# Patient Record
Sex: MALE | Race: WHITE | NOT HISPANIC OR LATINO | Employment: OTHER | URBAN - METROPOLITAN AREA
[De-identification: names, ages, dates, MRNs, and addresses within clinical notes are randomized per-mention and may not be internally consistent; named-entity substitution may affect disease eponyms.]

---

## 2019-01-11 ENCOUNTER — HOSPITAL ENCOUNTER (EMERGENCY)
Facility: HOSPITAL | Age: 74
Discharge: HOME | End: 2019-01-11
Attending: EMERGENCY MEDICINE | Admitting: EMERGENCY MEDICINE

## 2019-01-11 ENCOUNTER — APPOINTMENT (OUTPATIENT)
Dept: GENERAL RADIOLOGY | Facility: HOSPITAL | Age: 74
End: 2019-01-11

## 2019-01-11 VITALS
RESPIRATION RATE: 18 BRPM | BODY MASS INDEX: 42.66 KG/M2 | DIASTOLIC BLOOD PRESSURE: 77 MMHG | WEIGHT: 315 LBS | HEIGHT: 72 IN | TEMPERATURE: 98.1 F | HEART RATE: 77 BPM | OXYGEN SATURATION: 92 % | SYSTOLIC BLOOD PRESSURE: 123 MMHG

## 2019-01-11 DIAGNOSIS — J20.9 BRONCHOSPASM WITH BRONCHITIS, ACUTE: Primary | ICD-10-CM

## 2019-01-11 LAB
ALBUMIN SERPL-MCNC: 3.2 G/DL (ref 3.5–5.2)
ALBUMIN/GLOB SERPL: 0.9 G/DL
ALP SERPL-CCNC: 114 U/L (ref 39–117)
ALT SERPL W P-5'-P-CCNC: 43 U/L (ref 1–41)
ANION GAP SERPL CALCULATED.3IONS-SCNC: 11.9 MMOL/L
AST SERPL-CCNC: 40 U/L (ref 1–40)
BASOPHILS # BLD AUTO: 0.01 10*3/MM3 (ref 0–0.2)
BASOPHILS NFR BLD AUTO: 0.1 % (ref 0–1.5)
BILIRUB SERPL-MCNC: 0.9 MG/DL (ref 0.1–1.2)
BUN BLD-MCNC: 18 MG/DL (ref 8–23)
BUN/CREAT SERPL: 15.7 (ref 7–25)
CALCIUM SPEC-SCNC: 8.4 MG/DL (ref 8.6–10.5)
CHLORIDE SERPL-SCNC: 95 MMOL/L (ref 98–107)
CO2 SERPL-SCNC: 29.1 MMOL/L (ref 22–29)
CREAT BLD-MCNC: 1.15 MG/DL (ref 0.76–1.27)
D-LACTATE SERPL-SCNC: 2.1 MMOL/L (ref 0.5–2)
DEPRECATED RDW RBC AUTO: 56 FL (ref 37–54)
EOSINOPHIL # BLD AUTO: 0.02 10*3/MM3 (ref 0–0.7)
EOSINOPHIL NFR BLD AUTO: 0.2 % (ref 0.3–6.2)
ERYTHROCYTE [DISTWIDTH] IN BLOOD BY AUTOMATED COUNT: 14.5 % (ref 11.5–14.5)
FLUAV AG NPH QL: NEGATIVE
FLUBV AG NPH QL IA: NEGATIVE
GFR SERPL CREATININE-BSD FRML MDRD: 63 ML/MIN/1.73
GLOBULIN UR ELPH-MCNC: 3.5 GM/DL
GLUCOSE BLD-MCNC: 201 MG/DL (ref 65–99)
HCT VFR BLD AUTO: 45.1 % (ref 40.4–52.2)
HGB BLD-MCNC: 14.4 G/DL (ref 13.7–17.6)
HOLD SPECIMEN: NORMAL
IMM GRANULOCYTES # BLD AUTO: 0.03 10*3/MM3 (ref 0–0.03)
IMM GRANULOCYTES NFR BLD AUTO: 0.3 % (ref 0–0.5)
LYMPHOCYTES # BLD AUTO: 2.88 10*3/MM3 (ref 0.9–4.8)
LYMPHOCYTES NFR BLD AUTO: 25.1 % (ref 19.6–45.3)
MCH RBC QN AUTO: 33.4 PG (ref 27–32.7)
MCHC RBC AUTO-ENTMCNC: 31.9 G/DL (ref 32.6–36.4)
MCV RBC AUTO: 104.6 FL (ref 79.8–96.2)
MONOCYTES # BLD AUTO: 1 10*3/MM3 (ref 0.2–1.2)
MONOCYTES NFR BLD AUTO: 8.7 % (ref 5–12)
NEUTROPHILS # BLD AUTO: 7.52 10*3/MM3 (ref 1.9–8.1)
NEUTROPHILS NFR BLD AUTO: 65.6 % (ref 42.7–76)
NT-PROBNP SERPL-MCNC: 70.5 PG/ML (ref 0–900)
PLATELET # BLD AUTO: 207 10*3/MM3 (ref 140–500)
PMV BLD AUTO: 10.3 FL (ref 6–12)
POTASSIUM BLD-SCNC: 3.4 MMOL/L (ref 3.5–5.2)
PROCALCITONIN SERPL-MCNC: 0.06 NG/ML (ref 0.1–0.25)
PROT SERPL-MCNC: 6.7 G/DL (ref 6–8.5)
RBC # BLD AUTO: 4.31 10*6/MM3 (ref 4.6–6)
SODIUM BLD-SCNC: 136 MMOL/L (ref 136–145)
TROPONIN T SERPL-MCNC: <0.01 NG/ML (ref 0–0.03)
WBC NRBC COR # BLD: 11.46 10*3/MM3 (ref 4.5–10.7)

## 2019-01-11 PROCEDURE — 93010 ELECTROCARDIOGRAM REPORT: CPT | Performed by: INTERNAL MEDICINE

## 2019-01-11 PROCEDURE — 94799 UNLISTED PULMONARY SVC/PX: CPT

## 2019-01-11 PROCEDURE — 71046 X-RAY EXAM CHEST 2 VIEWS: CPT

## 2019-01-11 PROCEDURE — 36415 COLL VENOUS BLD VENIPUNCTURE: CPT | Performed by: EMERGENCY MEDICINE

## 2019-01-11 PROCEDURE — 99284 EMERGENCY DEPT VISIT MOD MDM: CPT

## 2019-01-11 PROCEDURE — 87804 INFLUENZA ASSAY W/OPTIC: CPT | Performed by: EMERGENCY MEDICINE

## 2019-01-11 PROCEDURE — 80053 COMPREHEN METABOLIC PANEL: CPT | Performed by: EMERGENCY MEDICINE

## 2019-01-11 PROCEDURE — 25010000002 METHYLPREDNISOLONE PER 125 MG: Performed by: EMERGENCY MEDICINE

## 2019-01-11 PROCEDURE — 94640 AIRWAY INHALATION TREATMENT: CPT

## 2019-01-11 PROCEDURE — 83605 ASSAY OF LACTIC ACID: CPT | Performed by: EMERGENCY MEDICINE

## 2019-01-11 PROCEDURE — 93005 ELECTROCARDIOGRAM TRACING: CPT | Performed by: EMERGENCY MEDICINE

## 2019-01-11 PROCEDURE — 83880 ASSAY OF NATRIURETIC PEPTIDE: CPT | Performed by: EMERGENCY MEDICINE

## 2019-01-11 PROCEDURE — 96374 THER/PROPH/DIAG INJ IV PUSH: CPT

## 2019-01-11 PROCEDURE — 85025 COMPLETE CBC W/AUTO DIFF WBC: CPT | Performed by: EMERGENCY MEDICINE

## 2019-01-11 PROCEDURE — 84145 PROCALCITONIN (PCT): CPT | Performed by: EMERGENCY MEDICINE

## 2019-01-11 PROCEDURE — 93005 ELECTROCARDIOGRAM TRACING: CPT

## 2019-01-11 PROCEDURE — 84484 ASSAY OF TROPONIN QUANT: CPT | Performed by: EMERGENCY MEDICINE

## 2019-01-11 RX ORDER — METHYLPREDNISOLONE SODIUM SUCCINATE 125 MG/2ML
125 INJECTION, POWDER, LYOPHILIZED, FOR SOLUTION INTRAMUSCULAR; INTRAVENOUS ONCE
Status: COMPLETED | OUTPATIENT
Start: 2019-01-11 | End: 2019-01-11

## 2019-01-11 RX ORDER — ALBUTEROL SULFATE 2.5 MG/3ML
2.5 SOLUTION RESPIRATORY (INHALATION) ONCE
Status: COMPLETED | OUTPATIENT
Start: 2019-01-11 | End: 2019-01-11

## 2019-01-11 RX ORDER — SPIRONOLACTONE 25 MG/1
25-50 TABLET ORAL DAILY
COMMUNITY

## 2019-01-11 RX ORDER — SODIUM CHLORIDE 0.9 % (FLUSH) 0.9 %
10 SYRINGE (ML) INJECTION AS NEEDED
Status: DISCONTINUED | OUTPATIENT
Start: 2019-01-11 | End: 2019-01-11 | Stop reason: HOSPADM

## 2019-01-11 RX ORDER — IPRATROPIUM BROMIDE AND ALBUTEROL SULFATE 2.5; .5 MG/3ML; MG/3ML
3 SOLUTION RESPIRATORY (INHALATION) ONCE
Status: COMPLETED | OUTPATIENT
Start: 2019-01-11 | End: 2019-01-11

## 2019-01-11 RX ORDER — ALBUTEROL SULFATE 90 UG/1
1-2 AEROSOL, METERED RESPIRATORY (INHALATION) EVERY 4 HOURS PRN
COMMUNITY

## 2019-01-11 RX ORDER — GUAIFENESIN AND DEXTROMETHORPHAN HYDROBROMIDE 600; 30 MG/1; MG/1
1 TABLET, EXTENDED RELEASE ORAL 2 TIMES DAILY PRN
Qty: 20 TABLET | Refills: 0 | Status: SHIPPED | OUTPATIENT
Start: 2019-01-11

## 2019-01-11 RX ORDER — PREDNISONE 20 MG/1
60 TABLET ORAL DAILY
Qty: 9 TABLET | Refills: 0 | Status: SHIPPED | OUTPATIENT
Start: 2019-01-11

## 2019-01-11 RX ORDER — MELATONIN
1000 DAILY
COMMUNITY

## 2019-01-11 RX ADMIN — ALBUTEROL SULFATE 2.5 MG: 2.5 SOLUTION RESPIRATORY (INHALATION) at 17:53

## 2019-01-11 RX ADMIN — IPRATROPIUM BROMIDE AND ALBUTEROL SULFATE 3 ML: 2.5; .5 SOLUTION RESPIRATORY (INHALATION) at 16:02

## 2019-01-11 RX ADMIN — METHYLPREDNISOLONE SODIUM SUCCINATE 125 MG: 125 INJECTION, POWDER, FOR SOLUTION INTRAMUSCULAR; INTRAVENOUS at 16:14

## 2019-01-11 NOTE — DISCHARGE INSTRUCTIONS
Take medication as prescribed.  Use your inhaler every 4-6 hours.  Return to emergency department for worsening shortness of breath, fever, chest pain, or other concern.  Follow-up with your primary care doctor next week if symptoms persist.

## 2019-01-11 NOTE — PROGRESS NOTES
Clinical Pharmacy Services: Medication History    Cali Alcantara is a 73 y.o. male presenting to [facility] for   Chief Complaint   Patient presents with   • Shortness of Breath       He  has a past medical history of Depression, Diabetes mellitus (CMS/Formerly [facility]), Disease of thyroid gland, GERD (gastroesophageal reflux disease), Gout, and Hyperlipidemia.    Allergies as of 01/11/2019   • (No Known Allergies)       Medication information was obtained from: Patient  Pharmacy and Phone Number: [facility] [phone], [employer]    Prior to Admission Medications     Prescriptions Last Dose Informant Patient Reported? Taking?    albuterol sulfate  (90 Base) MCG/ACT inhaler  Self No Yes    Take 1-2 puffs q 4-6 hours    albuterol sulfate  (90 Base) MCG/ACT inhaler  Self Yes Yes    Inhale 1-2 puffs Every 4 (Four) Hours As Needed for Wheezing.    allopurinol (ZYLOPRIM) 300 MG tablet  Self Yes Yes    Take 300 mg by mouth Daily.    ANDROGEL PUMP 20.25 MG/ACT (1.62%) gel  Self Yes Yes    Apply 2 Pump topically to the appropriate area as directed Daily.    atorvastatin (LIPITOR) 40 MG tablet  Self Yes Yes    Take 40 mg by mouth Daily.    cholecalciferol (VITAMIN D3) 1000 units tablet  Self Yes Yes    Take 1,000 Units by mouth Daily.    levothyroxine (SYNTHROID, LEVOTHROID) 112 MCG tablet  Self Yes Yes    Take 112 mcg by mouth Daily.    OLANZapine-FLUoxetine (SYMBYAX) 6-25 MG per capsule  Self Yes Yes    Take 1 capsule by mouth Daily.    pantoprazole (PROTONIX) 40 MG EC tablet  Self Yes Yes    Take 40 mg by mouth Daily.    pioglitazone (ACTOS) 15 MG tablet  Self Yes Yes    Take 15 mg by mouth Daily.    spironolactone (ALDACTONE) 25 MG tablet  Self Yes Yes    Take 25-50 mg by mouth Daily.    vitamin D (ERGOCALCIFEROL) 17177 units capsule capsule  Self Yes Yes    Take 50,000 Units by mouth 1 (One) Time Per Week.            Medication notes: Vitamin D OTC and Spironolactone added per patient medication  list    This medication list is complete to the best of my knowledge as of 1/11/2019    Please call if questions.    Carisa Mccall, Medication History Technician  1/11/2019 6:33 PM

## 2019-01-11 NOTE — ED TRIAGE NOTES
Pt reports he went to urgent care for shortness of breath that he has had for 2 weeks. Pt reports he was diagnosed with Pneumonia there and was told to come to ER.

## 2019-01-11 NOTE — ED PROVIDER NOTES
" EMERGENCY DEPARTMENT ENCOUNTER    CHIEF COMPLAINT  Chief Complaint: cough  History given by: patient  History limited by: nothing  Room Number: 21/21  PMD: Moises Matos MD      HPI:  Pt is a 73 y.o. male who presents complaining of a productive cough with \"green\" sputum that began one week ago. Pt also complains of dyspnea on exertion for the past week and a sore throat last week which has since resolved. Pt denies nausea, vomiting, calf pain, leg swelling, or chest pain. The patient reports that he was seen at  on 01/05/19 and was diagnosed with bronchitis. He was given a prescription for Prednisone and a Z-nica with minimal improvement. The patient was then seen at the  earlier today with complaints of a cough and SOA. He had a CXR done that showed an infiltrate of the right lower lobe. He was sent to the ER for further evaluation. Pt has a hx of hyperlipidemia and diabetes. Pt denies hx of COPD, asthma, or CHF. The patient has inhalers at home. Pt reports that he is prescribed a diuretic but has been noncompliant.  Pt was never a smoker. There are no other complaints at this time.    Duration: one week  Onset: gradual  Timing: constant  Location: respiratory  Radiation: none specified  Quality: productive cough with \"green\" sputum  Intensity/Severity: moderate  Progression: not specified  Associated Symptoms: dyspnea on exertion for the past week and a sore throat last week which has since resolved  Aggravating Factors: none specified  Alleviating Factors: none specified  Previous Episodes: none specified  Treatment before arrival: The patient reports that he was seen at  on 01/05/19 and was diagnosed with bronchitis. He was given a prescription for Prednisone and a Z-nica with minimal improvement. The patient was then seen at the  earlier today with complaints of a cough and SOA. He had a CXR done that showed an infiltrate of the right lower lobe. He was sent to the ER for further evaluation.    PAST " "MEDICAL HISTORY  Active Ambulatory Problems     Diagnosis Date Noted   • No Active Ambulatory Problems     Resolved Ambulatory Problems     Diagnosis Date Noted   • No Resolved Ambulatory Problems     Past Medical History:   Diagnosis Date   • Depression    • Diabetes mellitus (CMS/HCC)    • Disease of thyroid gland    • GERD (gastroesophageal reflux disease)    • Gout    • Hyperlipidemia        PAST SURGICAL HISTORY  History reviewed. No pertinent surgical history.    FAMILY HISTORY  Family History   Problem Relation Age of Onset   • No Known Problems Mother    • No Known Problems Father        SOCIAL HISTORY  Social History     Socioeconomic History   • Marital status: Single     Spouse name: Not on file   • Number of children: Not on file   • Years of education: Not on file   • Highest education level: Not on file   Social Needs   • Financial resource strain: Not on file   • Food insecurity - worry: Not on file   • Food insecurity - inability: Not on file   • Transportation needs - medical: Not on file   • Transportation needs - non-medical: Not on file   Occupational History   • Not on file   Tobacco Use   • Smoking status: Never Smoker   • Smokeless tobacco: Never Used   Substance and Sexual Activity   • Alcohol use: No     Frequency: Never   • Drug use: Defer   • Sexual activity: Defer   Other Topics Concern   • Not on file   Social History Narrative   • Not on file       ALLERGIES  Patient has no known allergies.    REVIEW OF SYSTEMS  Review of Systems   Constitutional: Negative for activity change, appetite change and fever.   HENT: Positive for sore throat (sore throat last week which has since resolved). Negative for congestion.    Eyes: Negative.    Respiratory: Positive for cough (productive cough with \"green\" sputum) and shortness of breath (dyspnea on exertion).    Cardiovascular: Negative for chest pain and leg swelling.   Gastrointestinal: Negative for abdominal pain, diarrhea, nausea and vomiting. "   Endocrine: Negative.    Genitourinary: Negative for decreased urine volume and dysuria.   Musculoskeletal: Negative for myalgias (of legs) and neck pain.   Skin: Negative for rash and wound.   Allergic/Immunologic: Negative.    Neurological: Negative for weakness, numbness and headaches.   Hematological: Negative.    Psychiatric/Behavioral: Negative.    All other systems reviewed and are negative.      PHYSICAL EXAM  ED Triage Vitals [01/11/19 1452]   Temp Heart Rate Resp BP SpO2   98.4 °F (36.9 °C) (!) 123 20 152/76 94 %      Temp src Heart Rate Source Patient Position BP Location FiO2 (%)   Tympanic Monitor -- -- --       Physical Exam   Constitutional: He is oriented to person, place, and time. No distress.   HENT:   Head: Normocephalic and atraumatic.   Mouth/Throat: Oropharynx is clear and moist.   Eyes: EOM are normal. Pupils are equal, round, and reactive to light.   Neck: Normal range of motion. Neck supple.   Cardiovascular: Normal rate, regular rhythm and normal heart sounds. Exam reveals no gallop and no friction rub.   No murmur heard.  Pulmonary/Chest: Effort normal. No respiratory distress. He has decreased breath sounds in the right lower field and the left lower field. He has wheezes (scattered bilaterally). He has no rhonchi. He has no rales. He exhibits no tenderness.   Abdominal: Soft. Bowel sounds are normal. He exhibits no distension and no mass. There is no tenderness. There is no rebound and no guarding.   Abdomen is obese.   Musculoskeletal: Normal range of motion. He exhibits no edema (pedal) or tenderness (of calf).   Neurological: He is alert and oriented to person, place, and time. He has normal sensation and normal strength.   Skin: Skin is warm and dry.   Psychiatric: Mood and affect normal.   Nursing note and vitals reviewed.      LAB RESULTS  Lab Results (last 24 hours)     Procedure Component Value Units Date/Time    Influenza Antigen, Rapid - Swab, Nasopharynx [[id number]]   (Normal) Collected:  01/11/19 1534    Specimen:  Swab from Nasopharynx Updated:  01/11/19 1602     Influenza A Ag, EIA Negative     Influenza B Ag, EIA Negative    CBC & Differential [[id number]] Collected:  01/11/19 1541    Specimen:  Blood Updated:  01/11/19 1609    Narrative:       The following orders were created for panel order CBC & Differential.  Procedure                               Abnormality         Status                     ---------                               -----------         ------                     CBC Auto Differential[[id number]]        Abnormal            Final result                 Please view results for these tests on the individual orders.    Comprehensive Metabolic Panel [[id number]]  (Abnormal) Collected:  01/11/19 1541    Specimen:  Blood Updated:  01/11/19 1612     Glucose 201 mg/dL      BUN 18 mg/dL      Creatinine 1.15 mg/dL      Sodium 136 mmol/L      Potassium 3.4 mmol/L      Chloride 95 mmol/L      CO2 29.1 mmol/L      Calcium 8.4 mg/dL      Total Protein 6.7 g/dL      Albumin 3.20 g/dL      ALT (SGPT) 43 U/L      AST (SGOT) 40 U/L      Alkaline Phosphatase 114 U/L      Total Bilirubin 0.9 mg/dL      eGFR Non African Amer 63 mL/min/1.73      Globulin 3.5 gm/dL      A/G Ratio 0.9 g/dL      BUN/Creatinine Ratio 15.7     Anion Gap 11.9 mmol/L     Narrative:       The MDRD GFR formula is only valid for adults with stable renal function between ages 18 and 70.    BNP [[id number]]  (Normal) Collected:  01/11/19 1541    Specimen:  Blood Updated:  01/11/19 1617     proBNP 70.5 pg/mL     Narrative:       Among patients with dyspnea, NT-proBNP is highly sensitive for the detection of acute congestive heart failure. In addition NT-proBNP of <300 pg/ml effectively rules out acute congestive heart failure with 99% negative predictive value.    Troponin [[id number]]  (Normal) Collected:  01/11/19 1541    Specimen:  Blood Updated:  01/11/19 1617     Troponin T <0.010 ng/mL     Narrative:     "   Troponin T Reference Ranges:  Less than 0.03 ng/mL:    Negative for AMI  0.03 to 0.09 ng/mL:      Indeterminant for AMI  Greater than 0.09 ng/mL: Positive for AMI    Procalcitonin [[id number]]  (Abnormal) Collected:  01/11/19 1541    Specimen:  Blood Updated:  01/11/19 1617     Procalcitonin 0.06 ng/mL     Narrative:       As a Marker for Sepsis (Non-Neonates):   1. <0.5 ng/mL represents a low risk of severe sepsis and/or septic shock.  1. >2 ng/mL represents a high risk of severe sepsis and/or septic shock.    As a Marker for Lower Respiratory Tract Infections that require antibiotic therapy:  PCT on Admission     Antibiotic Therapy             6-12 Hrs later  > 0.5                Strongly Recommended            >0.25 - <0.5         Recommended  0.1 - 0.25           Discouraged                   Remeasure/reassess PCT  <0.1                 Strongly Discouraged          Remeasure/reassess PCT      As 28 day mortality risk marker: \"Change in Procalcitonin Result\" (> 80 % or <=80 %) if Day 0 (or Day 1) and Day 4 values are available. Refer to http://www.eMag[employer]-pct-calculator.com/   Change in PCT <=80 %   A decrease of PCT levels below or equal to 80 % defines a positive change in PCT test result representing a higher risk for 28-day all-cause mortality of patients diagnosed with severe sepsis or septic shock.  Change in PCT > 80 %   A decrease of PCT levels of more than 80 % defines a negative change in PCT result representing a lower risk for 28-day all-cause mortality of patients diagnosed with severe sepsis or septic shock.                Lactic Acid, Plasma [[id number]]  (Abnormal) Collected:  01/11/19 1541    Specimen:  Blood Updated:  01/11/19 1600     Lactate 2.1 mmol/L     CBC Auto Differential [[id number]]  (Abnormal) Collected:  01/11/19 1541    Specimen:  Blood Updated:  01/11/19 1609     WBC 11.46 10*3/mm3      RBC 4.31 10*6/mm3      Hemoglobin 14.4 g/dL      Hematocrit 45.1 %      .6 fL      MCH " 33.4 pg      MCHC 31.9 g/dL      RDW 14.5 %      RDW-SD 56.0 fl      MPV 10.3 fL      Platelets 207 10*3/mm3      Neutrophil % 65.6 %      Lymphocyte % 25.1 %      Monocyte % 8.7 %      Eosinophil % 0.2 %      Basophil % 0.1 %      Immature Grans % 0.3 %      Neutrophils, Absolute 7.52 10*3/mm3      Lymphocytes, Absolute 2.88 10*3/mm3      Monocytes, Absolute 1.00 10*3/mm3      Eosinophils, Absolute 0.02 10*3/mm3      Basophils, Absolute 0.01 10*3/mm3      Immature Grans, Absolute 0.03 10*3/mm3     Lactic Acid, Reflex Timer (This will reflex a repeat order 3-3:15 hours after ordered.) [[id number]] Collected:  01/11/19 1541    Specimen:  Blood Updated:  01/11/19 1600          I ordered the above labs and reviewed the results    RADIOLOGY  XR Chest 2 View   There is elevation of the right hemidiaphragm unchanged from  08/04/2010. The lungs are clear except for a localized band of  atelectasis at the right base and there is also some minimal vascular  congestion on the current exam. The heart remains slightly enlarged  without change           I ordered the above noted radiological studies. Interpreted by radiologist. Reviewed by me in PACS.       PROCEDURES  Procedures    EKG          EKG time: 1502  Rhythm/Rate: SR, 94  P waves and VT: nml; nml  QRS, axis: LAD; nml QRS  ST and T waves: nml; nml     Interpreted Contemporaneously by me, independently viewed  Unchanged compared to prior from 12/03/12.      PROGRESS AND CONSULTS  1500 EKG was ordered d/t protocol.    1517 Ordered CXR, Influenza Swab, and blood work for further evaluation.    1543 Ordered Duo-Neb and Solu-Medrol to treat patient's breathing.    1647 Ordered nebulizer to treat patient's breathing.    1755 Rechecked the patient who is resting comfortably and in NAD. The patient states that his breathing has improved. The nebulizer is currently in process. Vital signs are stable.  BP- 105/78 HR- 83 Temp- 98.4 °F (36.9 °C) (Tympanic) O2 sat- 100%. On exam,  there is rhonchi bilaterally but no wheezing present. Informed the patient of his lab results that show WBC of 11.46, Lactic Acid of 2.1, Procalcitonin of 0.06, BNP of 70.5, negative Influenza, and Troponin is <0.010. Also informed the patient of his CXR that shows elevation of the right hemidiaphragm unchanged from 08/04/2010. The lungs are clear except for a localized band of atelectasis at the right base and there is also some minimal vascular congestion on the current exam. The heart remains slightly enlarged without change. Discussed the plan to reevaluate the patient once his breathing treatment has been completed. Pt understands and agrees with the plan, all questions answered.    1819 Rechecked the patient who is resting comfortably and in NAD. Patient reports that his breathing has improved since receiving the breathing treatment in the ED. Vital signs are stable. BP- 105/78 HR- 83 Temp- 98.4 °F (36.9 °C) (Tympanic) O2 sat- 92% on RA. Patient reports that his baseline oxygen saturation is around 92% on RA. Discussed at length inpatient vs outpatient. The patient reports that he would like to be discharged home. Discussed the plan for discharge with a prescription for Prednisone and Mucinex DM and instructions to f/u with his PCP for further evaluation and management. Advised the patient to use his inhaler q4-6h. Strict RTER warnings given. Pt understands and agrees with the plan, all questions answered.      MEDICAL DECISION MAKING  Results were reviewed/discussed with the patient and they were also made aware of online access. Pt also made aware that some labs, such as cultures, will not be resulted during ER visit and follow up with PMD is necessary.     MDM  Number of Diagnoses or Management Options  Bronchospasm with bronchitis, acute:   Diagnosis management comments: Patient was recently treated for bronchitis with antibiotics, albuterol, and prednisone.  Workup today was negative for pneumonia or CHF.   He was given IV steroids and 2 nebulizer treatments.  His symptoms improved.  Oxygen saturation was 93% on room air.  Patient declined admission.  Patient was afebrile.  He'll be discharged with prescriptions for prednisone and Mucinex.  He was advised to return to emergency department for worsening shortness of breath or fever.       Amount and/or Complexity of Data Reviewed  Clinical lab tests: ordered and reviewed (Lactic Acid: 2.1, WBC: 11.46, Negative Flu test, Procalcitonin: 0.06, BNP: 70.5, and Troponin is <0.010.)  Tests in the radiology section of CPT®: ordered and reviewed (CXR that shows elevation of the right hemidiaphragm unchanged from 08/04/2010. The lungs are clear except for a localized band of atelectasis at the right base and there is also some minimal vascular congestion on the current exam. The heart remains slightly enlarged without change.)  Tests in the medicine section of CPT®: ordered and reviewed (See procedure notes for EKG interpretation.)  Decide to obtain previous medical records or to obtain history from someone other than the patient: yes  Review and summarize past medical records: yes (The patient was seen at  on 01/05/19 and was diagnosed with bronchitis. He was given a prescription for Prednisone and a Z-nica at that time. The patient was also seen at the  earlier today with complaints of a cough and SOA. He had a CXR done that they read as showing a right lower lobe infiltrate. The patient was sent to ER for further evaluation.)  Independent visualization of images, tracings, or specimens: yes    Patient Progress  Patient progress: stable         DIAGNOSIS  Final diagnoses:   Bronchospasm with bronchitis, acute       DISPOSITION  DISCHARGE    Patient discharged in stable condition.    Reviewed implications of results, diagnosis, meds, responsibility to follow up, warning signs and symptoms of possible worsening, potential complications and reasons to return to ER, including  any new or worsening symptoms.    Patient/Family voiced understanding of above instructions.    Discussed plan for discharge, as there is no emergent indication for admission. Patient referred to primary care provider for BP management due to today's BP. Pt/family is agreeable and understands need for follow up and repeat testing.  Pt is aware that discharge does not mean that nothing is wrong but it indicates no emergency is present that requires admission and they must continue care with follow-up as given below or physician of their choice.     FOLLOW-UP  Moises Maots MD  [facility] [address] [phone]    Call in 3 days  If symptoms persist         Medication List      New Prescriptions    guaifenesin-dextromethorphan  MG tablet sustained-release 12 hour   tablet  Take 1 tablet by mouth 2 (Two) Times a Day As Needed (cough).     predniSONE 20 MG tablet  Commonly known as:  DELTASONE  Take 3 tablets by mouth Daily.        ASK your doctor about these medications    * albuterol sulfate  (90 Base) MCG/ACT inhaler  Commonly known as:  PROVENTIL HFA;VENTOLIN HFA;PROAIR HFA  Ask about: Which instructions should I use?     * albuterol sulfate  (90 Base) MCG/ACT inhaler  Commonly known as:  PROVENTIL HFA;VENTOLIN HFA;PROAIR HFA  Take 1-2 puffs q 4-6 hours  Ask about: Which instructions should I use?         * This list has 2 medication(s) that are the same as other medications   prescribed for you. Read the directions carefully, and ask your doctor or   other care provider to review them with you.                  Latest Documented Vital Signs:  As of 6:52 PM  BP- 105/78 HR- 83 Temp- 98.4 °F (36.9 °C) (Tympanic) O2 sat- 91%    --  Documentation assistance provided by emperatriz Alanis for Dr. Juan MD.  Information recorded by the emperatriz was done at my direction and has been verified and validated by me.       Cristy Alanis  01/11/19 5733       Raul Martinez,  MD  01/11/19 7868

## 2024-10-30 ENCOUNTER — TRANSCRIBE ORDERS (OUTPATIENT)
Dept: ADMINISTRATIVE | Facility: HOSPITAL | Age: 79
End: 2024-10-30
Payer: MEDICARE

## 2024-10-30 ENCOUNTER — HOSPITAL ENCOUNTER (OUTPATIENT)
Dept: GENERAL RADIOLOGY | Facility: HOSPITAL | Age: 79
Discharge: HOME | End: 2024-10-30
Admitting: FAMILY MEDICINE
Payer: MEDICARE

## 2024-10-30 DIAGNOSIS — R07.9 CHEST PAIN, UNSPECIFIED TYPE: ICD-10-CM

## 2024-10-30 DIAGNOSIS — R07.9 CHEST PAIN, UNSPECIFIED TYPE: Primary | ICD-10-CM

## 2024-10-30 PROCEDURE — 71046 X-RAY EXAM CHEST 2 VIEWS: CPT
